# Patient Record
Sex: MALE | Race: WHITE | Employment: OTHER | URBAN - METROPOLITAN AREA
[De-identification: names, ages, dates, MRNs, and addresses within clinical notes are randomized per-mention and may not be internally consistent; named-entity substitution may affect disease eponyms.]

---

## 2018-11-26 ENCOUNTER — OFFICE VISIT (OUTPATIENT)
Dept: RHEUMATOLOGY | Age: 83
End: 2018-11-26
Payer: MEDICARE

## 2018-11-26 VITALS
TEMPERATURE: 99.1 F | BODY MASS INDEX: 24.01 KG/M2 | HEART RATE: 74 BPM | HEIGHT: 67 IN | DIASTOLIC BLOOD PRESSURE: 78 MMHG | SYSTOLIC BLOOD PRESSURE: 136 MMHG | WEIGHT: 153 LBS

## 2018-11-26 DIAGNOSIS — M25.50 POLYARTHRALGIA: Primary | ICD-10-CM

## 2018-11-26 PROCEDURE — G8420 CALC BMI NORM PARAMETERS: HCPCS | Performed by: INTERNAL MEDICINE

## 2018-11-26 PROCEDURE — 1101F PT FALLS ASSESS-DOCD LE1/YR: CPT | Performed by: INTERNAL MEDICINE

## 2018-11-26 PROCEDURE — 1036F TOBACCO NON-USER: CPT | Performed by: INTERNAL MEDICINE

## 2018-11-26 PROCEDURE — 1123F ACP DISCUSS/DSCN MKR DOCD: CPT | Performed by: INTERNAL MEDICINE

## 2018-11-26 PROCEDURE — G8484 FLU IMMUNIZE NO ADMIN: HCPCS | Performed by: INTERNAL MEDICINE

## 2018-11-26 PROCEDURE — 4040F PNEUMOC VAC/ADMIN/RCVD: CPT | Performed by: INTERNAL MEDICINE

## 2018-11-26 PROCEDURE — 99204 OFFICE O/P NEW MOD 45 MIN: CPT | Performed by: INTERNAL MEDICINE

## 2018-11-26 PROCEDURE — G8427 DOCREV CUR MEDS BY ELIG CLIN: HCPCS | Performed by: INTERNAL MEDICINE

## 2018-11-26 RX ORDER — PREDNISONE 10 MG/1
TABLET ORAL
Qty: 25 TABLET | Refills: 0 | Status: ON HOLD | OUTPATIENT
Start: 2018-11-26 | End: 2020-11-30

## 2018-11-26 NOTE — PROGRESS NOTES
[address], [place]                                                           [address] [facility], [address] [place] [address]                                                              (O) [phone] (F)      Dear Dr. Lex Heard:  Please find Rheumatology assessment. Thank you for giving me the opportunity to be involved in [address] care and I look forward following Abraham Emerson along with you. If you have any questions or concerns please feel free to reach me. Note is transcribed using voice recognition software. Inadvertent computerized transcription errors may be present. Patient identification: Sherlyn Tee: [id number],30 y.o. Sex: male     Assessment / Plan:  Abraham Emerson was seen today for joint pain. Diagnoses and all orders for this visit:    Polyarthralgia  -     C-Reactive Protein; Future  -     Sedimentation Rate; Future  -     XR HAND LEFT (MIN 3 VIEWS); Future  -     XR HAND RIGHT (MIN 3 VIEWS); Future  -     CBC Auto Differential; Future    Other orders  -     predniSONE (DELTASONE) 10 MG tablet; 2 pills po q AM for 7 days. 1pill po Q AM for next 7 days. 1/2 pill po Q AM for next 7 days and stop. 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 Differential includes systemic inflammatory arthritis, CPPD arthropathy or less likely osteoarthritis flare. 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     Plan-  I gave him 20 mg prednisone taper, to see if that helps with his symptoms. If symptoms relapse or it does not help, recommend doing hand x-ray and blood work as above, and return for Follow up. Patient indicates understanding and agrees with the management plan. I reviewed patient's history, referral documents and electronic medical records. Copy of consult note is being routed electronically/faxed to referring physician. #######################################################################    REASON FOR CONSULTATION:  Patient is being seen at the request of  Vik Yap MD for evaluation of hand pain and swelling. HISTORY OF PRESENT ILLNESS:  80 y.o. male has been experiencing gradual onset of hand pain, swelling, stiffness for last 4 months, symptoms remain unchanged since the onset 4 months ago. Symptoms are worse in the morning, and stiffness to get 3 hours, symptoms never completely get better. Advil helps to some extent. He had similar presentation 6 years ago. He was diagnosed with prostate cancer, at that time, he was given low-dose course of prednisone for 4-6 weeks, was told to have our RS3p syndrome. Symptoms did not relapse up until 4 months ago. Asymptomatic in other areas. Prostate cancer is in remission. All other 12 system ROS are negative. Past Medical History:   Diagnosis Date    Hyperlipidemia     Prostate cancer [facility]) 2013    Radiation     Past Surgical History:   Procedure Laterality Date    APPENDECTOMY       Social History     Social History    Marital status:      Spouse name: N/A    Number of children: N/A    Years of education: N/A     Occupational History    Not on file.      Social History Main Topics    Smoking status: Never Smoker    Smokeless tobacco: Never Used    Alcohol use No    Drug use: No    Sexual activity: Yes     Partners: Female     Other Topics Concern    Not on file     Social History Narrative    No narrative on file     No family history of autoimmune diseases    Current Outpatient pupils. No oral or nasal ulcers. Salivary glands reveal no evidence of abnormality. External inspection of the ears and nose within normal limits. Neck: No masses or asymmetry. No thyroid enlargement. Chest: Normal effort, clear to auscultation. Heart:  Normal s1/s2, no leg edema. Abdomen: soft, non-tender. Liver no palpable. Neurologic: normal deep tendon reflexes. No foot or wrist drop. DATA:        A/P- See above.

## 2020-11-23 ENCOUNTER — OFFICE VISIT (OUTPATIENT)
Dept: PRIMARY CARE CLINIC | Age: 85
End: 2020-11-23
Payer: MEDICARE

## 2020-11-23 PROCEDURE — 99211 OFF/OP EST MAY X REQ PHY/QHP: CPT | Performed by: NURSE PRACTITIONER

## 2020-11-24 LAB — SARS-COV-2: NOT DETECTED

## 2020-11-30 ENCOUNTER — HOSPITAL ENCOUNTER (OUTPATIENT)
Age: 85
Setting detail: OUTPATIENT SURGERY
Discharge: HOME | End: 2020-11-30
Attending: INTERNAL MEDICINE | Admitting: INTERNAL MEDICINE
Payer: MEDICARE

## 2020-11-30 VITALS
DIASTOLIC BLOOD PRESSURE: 68 MMHG | TEMPERATURE: 96.3 F | OXYGEN SATURATION: 96 % | SYSTOLIC BLOOD PRESSURE: 127 MMHG | RESPIRATION RATE: 16 BRPM | HEART RATE: 52 BPM

## 2020-11-30 PROCEDURE — 88305 TISSUE EXAM BY PATHOLOGIST: CPT

## 2020-11-30 PROCEDURE — 6360000002 HC RX W HCPCS: Performed by: INTERNAL MEDICINE

## 2020-11-30 PROCEDURE — 7100000010 HC PHASE II RECOVERY - FIRST 15 MIN: Performed by: INTERNAL MEDICINE

## 2020-11-30 PROCEDURE — 7100000011 HC PHASE II RECOVERY - ADDTL 15 MIN: Performed by: INTERNAL MEDICINE

## 2020-11-30 PROCEDURE — 3609010300 HC COLONOSCOPY W/BIOPSY SINGLE/MULTIPLE: Performed by: INTERNAL MEDICINE

## 2020-11-30 PROCEDURE — 2709999900 HC NON-CHARGEABLE SUPPLY: Performed by: INTERNAL MEDICINE

## 2020-11-30 PROCEDURE — 99153 MOD SED SAME PHYS/QHP EA: CPT | Performed by: INTERNAL MEDICINE

## 2020-11-30 PROCEDURE — 99152 MOD SED SAME PHYS/QHP 5/>YRS: CPT | Performed by: INTERNAL MEDICINE

## 2020-11-30 RX ORDER — FENTANYL CITRATE 50 UG/ML
INJECTION, SOLUTION INTRAMUSCULAR; INTRAVENOUS PRN
Status: DISCONTINUED | OUTPATIENT
Start: 2020-11-30 | End: 2020-11-30 | Stop reason: ALTCHOICE

## 2020-11-30 RX ORDER — SODIUM CHLORIDE 9 MG/ML
INJECTION, SOLUTION INTRAVENOUS CONTINUOUS
Status: DISCONTINUED | OUTPATIENT
Start: 2020-11-30 | End: 2020-11-30 | Stop reason: HOSPADM

## 2020-11-30 RX ORDER — MIDAZOLAM HYDROCHLORIDE 1 MG/ML
INJECTION INTRAMUSCULAR; INTRAVENOUS PRN
Status: DISCONTINUED | OUTPATIENT
Start: 2020-11-30 | End: 2020-11-30 | Stop reason: ALTCHOICE

## 2020-11-30 ASSESSMENT — PAIN - FUNCTIONAL ASSESSMENT
PAIN_FUNCTIONAL_ASSESSMENT: FACES
PAIN_FUNCTIONAL_ASSESSMENT: 0-10
PAIN_FUNCTIONAL_ASSESSMENT: FACES
PAIN_FUNCTIONAL_ASSESSMENT: FACES
PAIN_FUNCTIONAL_ASSESSMENT: 0-10
PAIN_FUNCTIONAL_ASSESSMENT: FACES

## 2020-11-30 NOTE — PROGRESS NOTES
Progress Note  Date:11/30/2020       Room:Endo Pool/NONE  Patient Name:Vin Hess     YOB: 1934     Age:86 y.o. Subjective    Subjective:  Symptoms:  (NONE). Diet:  NPO. Activity level: Normal.    Pain:  He reports no pain. Review of Systems  Objective         Vitals Last 24 Hours:  TEMPERATURE:  No data recorded  RESPIRATIONS RANGE: No data recorded  PULSE OXIMETRY RANGE: No data recorded  PULSE RANGE: No data recorded  BLOOD PRESSURE RANGE: No data recorded.  ; No data recorded. I/O (24Hr): No intake or output data in the 24 hours ending 11/30/20 0754  Objective:  General Appearance:  Comfortable and well-appearing. Vital signs: (most recent): There were no vitals taken for this visit. Output: Producing urine and producing stool. Lungs:  Normal effort. Abdomen: Abdomen is soft. There is no abdominal tenderness. Extremities: Normal range of motion. Neurological: Patient is alert and oriented to person, place and time. Skin:  Warm. Labs/Imaging/Diagnostics    Labs:  CBC:No results for input(s): WBC, RBC, HGB, HCT, MCV, RDW, PLT in the last 72 hours. CHEMISTRIES:No results for input(s): NA, K, CL, CO2, BUN, CREATININE, GLUCOSE, PHOS, MG in the last 72 hours. Invalid input(s): CA  PT/INR:No results for input(s): PROTIME, INR in the last 72 hours. APTT:No results for input(s): APTT in the last 72 hours. LIVER PROFILE:No results for input(s): AST, ALT, BILIDIR, BILITOT, ALKPHOS in the last 72 hours. Imaging Last 24 Hours:  No results found.   Assessment//Plan           Assessment & Plan    Electronically signed by Deanne Rodriguez RN on 11/30/20 at 7:54 AM EST

## 2020-11-30 NOTE — PROGRESS NOTES
Reviewed discharge instructions with wife. Verbalizes understanding. Denies any questions. Waiting for pt to wake up and to seen by physician prior to discharge.

## 2020-11-30 NOTE — PROGRESS NOTES
Pt tolerated procedure well. Denies any pain or nausea post procedure. Reviewed discharged instructions with pt and wife. Verbalize understanding of instructions. Deny questions. Discharged in wheelchair to lobby level. Wife to drive home.

## 2022-11-22 NOTE — H&P
History and Physical / Pre-Sedation Assessment    Leonora Buenrostro is a 80 y.o. male who presents today for colonoscopy procedure. PMHx:    Past Medical History:   Diagnosis Date    Hyperlipidemia     Prostate cancer ([facility] [address] 2013    Radiation       Medications:    Prior to Admission medications    Medication Sig Start Date End Date Taking? Authorizing Provider   simvastatin (ZOCOR) 20 MG tablet Take 20 mg by mouth nightly. Historical Provider, MD       Allergies:    Allergies   Allergen Reactions    Augmentin [Amoxicillin-Pot Clavulanate] Hives     diarrhea    Levofloxacin Other (See Comments)     Insomnia / leg swelling    Mycostatin [Nystatin] Hives    Myrbetriq [Mirabegron] Other (See Comments)     Insomnia / leg swelling    Rapaflo [Silodosin] Other (See Comments)     Insomnia / leg swelling    Suprax [Cefixime] Hives     diarrhea    Avodart [Dutasteride] Rash    Flomax [Tamsulosin Hcl] Rash    Ibuprofen Nausea And Vomiting    Vesicare [Solifenacin] Rash       PSHx:    Past Surgical History:   Procedure Laterality Date    APPENDECTOMY         Social Hx:    Social History     Socioeconomic History    Marital status:      Spouse name: Not on file    Number of children: Not on file    Years of education: Not on file    Highest education level: Not on file   Occupational History    Not on file   Social Needs    Financial resource strain: Not on file    Food insecurity     Worry: Not on file     Inability: Not on file    Transportation needs     Medical: Not on file     Non-medical: Not on file   Tobacco Use    Smoking status: Never Smoker    Smokeless tobacco: Never Used   Substance and Sexual Activity    Alcohol use: No    Drug use: No    Sexual activity: Yes     Partners: Female   Lifestyle    Physical activity     Days per week: Not on file     Minutes per session: Not on file    Stress: Not on file   Relationships    Social connections     Talks on phone: Not on file Oriented to person, place and time, normal sensation, short and long term memory intact Gets together: Not on file     Attends Cheondo[employer] service: Not on file     Active member of club or organization: Not on file     Attends meetings of clubs or organizations: Not on file     Relationship status: Not on file    Intimate partner violence     Fear of current or ex partner: Not on file     Emotionally abused: Not on file     Physically abused: Not on file     Forced sexual activity: Not on file   Other Topics Concern    Not on file   Social History Narrative    Not on file       Family Hx: History reviewed. No pertinent family history. Physical Exam:  Vital Signs: There were no vitals taken for this visit. Pulmonary: Normal  Cardiac: Normal  Abdomen: Normal    Pre-Procedure Assessment / Plan:  ASA Classification: Class 2 - A normal healthy patient with mild systemic disease  Level of Sedation Plan: Moderate sedation   Mallampati Score: I (soft palate, uvula, fauces, tonsillar pillars visible)  Post Procedure plan: Return to same level of care    Colonoscopy Interval History:  3 or more years since last colonoscopy, Less than 3 years since the patient's last colonoscopy due to medical reasons and Less than 3 years since the patient's last colonoscopy due to system reasons    Medical Reason for Colonoscopy before 3 years last colonoscopy incomplete, last colonoscopy had inadequate prep, piecemeal removal of adenomas and last colonoscopy found greater than 10 adenomas  System Reasons for Colonoscopy before 3 years:   previous colonoscopy report unavailable or unable to locate    I assessed the patient and find that the patient is in satisfactory condition to proceed with the planned procedure and sedation plan. Risks/benefits/alternatives of procedure discussed with patient and any present family members. Risks including, but not limited to: bleeding, perforation, post polypectomy syndrome, splenic injury, need for additional procedures or surgery, risks of anesthesia.  Patient understands it is

## (undated) DEVICE — CANNULA SAMP CO2 AD GRN 7FT CO2 AND 7FT O2 TBNG UNIV CONN

## (undated) DEVICE — FORCEPS BX L240CM JAW DIA2.4MM ORNG L CAP W/ NDL DISP RAD